# Patient Record
Sex: FEMALE | Race: WHITE | NOT HISPANIC OR LATINO
[De-identification: names, ages, dates, MRNs, and addresses within clinical notes are randomized per-mention and may not be internally consistent; named-entity substitution may affect disease eponyms.]

---

## 2019-12-01 ENCOUNTER — TRANSCRIPTION ENCOUNTER (OUTPATIENT)
Age: 69
End: 2019-12-01

## 2019-12-02 ENCOUNTER — OUTPATIENT (OUTPATIENT)
Dept: OUTPATIENT SERVICES | Facility: HOSPITAL | Age: 69
LOS: 1 days | End: 2019-12-02
Payer: MEDICARE

## 2019-12-02 VITALS
WEIGHT: 136.25 LBS | HEART RATE: 67 BPM | SYSTOLIC BLOOD PRESSURE: 136 MMHG | DIASTOLIC BLOOD PRESSURE: 73 MMHG | TEMPERATURE: 98 F | RESPIRATION RATE: 16 BRPM | OXYGEN SATURATION: 98 % | HEIGHT: 59 IN

## 2019-12-02 VITALS
OXYGEN SATURATION: 100 % | RESPIRATION RATE: 16 BRPM | HEART RATE: 79 BPM | DIASTOLIC BLOOD PRESSURE: 68 MMHG | SYSTOLIC BLOOD PRESSURE: 129 MMHG

## 2019-12-02 DIAGNOSIS — Z98.890 OTHER SPECIFIED POSTPROCEDURAL STATES: Chronic | ICD-10-CM

## 2019-12-02 DIAGNOSIS — H33.012 RETINAL DETACHMENT WITH SINGLE BREAK, LEFT EYE: ICD-10-CM

## 2019-12-02 DIAGNOSIS — Z98.49 CATARACT EXTRACTION STATUS, UNSPECIFIED EYE: Chronic | ICD-10-CM

## 2019-12-02 DIAGNOSIS — Z90.49 ACQUIRED ABSENCE OF OTHER SPECIFIED PARTS OF DIGESTIVE TRACT: Chronic | ICD-10-CM

## 2019-12-02 DIAGNOSIS — Z90.710 ACQUIRED ABSENCE OF BOTH CERVIX AND UTERUS: Chronic | ICD-10-CM

## 2019-12-02 PROCEDURE — 67108 REPAIR DETACHED RETINA: CPT | Mod: LT

## 2019-12-02 PROCEDURE — 93005 ELECTROCARDIOGRAM TRACING: CPT

## 2019-12-02 PROCEDURE — 99202 OFFICE O/P NEW SF 15 MIN: CPT

## 2019-12-02 PROCEDURE — C1889: CPT

## 2019-12-02 PROCEDURE — 93010 ELECTROCARDIOGRAM REPORT: CPT

## 2019-12-02 NOTE — ASU DISCHARGE PLAN (ADULT/PEDIATRIC) - CARE PROVIDER_API CALL
Ralph Lopez)  Ophthalmology  [address] [place] [address], [address]  [place] [address]  Phone: [phone]  Fax: [phone]  Follow Up Time:

## 2019-12-02 NOTE — ASU DISCHARGE PLAN (ADULT/PEDIATRIC) - ASU DC SPECIAL INSTRUCTIONSFT
tylenol  (acetaminophen) for pain  leave on eye patch  Position sitting up  Sleep on either side,   but do NOT sleep on back

## 2019-12-02 NOTE — ASU PATIENT PROFILE, ADULT - PSH
History of colonoscopy    Status post cataract extraction  both eyes  Status post cholecystectomy  lparascopically  Status post total abdominal hysterectomy and bilateral salpingo-oophorectomy

## 2022-03-27 PROBLEM — E03.9 HYPOTHYROIDISM, UNSPECIFIED: Chronic | Status: ACTIVE | Noted: 2019-12-02

## 2022-03-27 PROBLEM — J45.909 UNSPECIFIED ASTHMA, UNCOMPLICATED: Chronic | Status: ACTIVE | Noted: 2019-12-02

## 2022-03-27 PROBLEM — E78.5 HYPERLIPIDEMIA, UNSPECIFIED: Chronic | Status: ACTIVE | Noted: 2019-12-02

## 2022-03-27 PROBLEM — F32.9 MAJOR DEPRESSIVE DISORDER, SINGLE EPISODE, UNSPECIFIED: Chronic | Status: ACTIVE | Noted: 2019-12-02

## 2022-05-10 ENCOUNTER — APPOINTMENT (OUTPATIENT)
Dept: ORTHOPEDIC SURGERY | Facility: CLINIC | Age: 72
End: 2022-05-10
Payer: MEDICARE

## 2022-05-10 VITALS — HEIGHT: 61 IN | WEIGHT: 134 LBS | BODY MASS INDEX: 25.3 KG/M2

## 2022-05-10 DIAGNOSIS — Z78.9 OTHER SPECIFIED HEALTH STATUS: ICD-10-CM

## 2022-05-10 PROBLEM — Z00.00 ENCOUNTER FOR PREVENTIVE HEALTH EXAMINATION: Status: ACTIVE | Noted: 2022-05-10

## 2022-05-10 PROCEDURE — 99214 OFFICE O/P EST MOD 30 MIN: CPT

## 2022-05-10 RX ORDER — MELOXICAM 15 MG/1
15 TABLET ORAL
Qty: 30 | Refills: 1 | Status: ACTIVE | COMMUNITY
Start: 2022-05-10 | End: 1900-01-01

## 2022-05-10 NOTE — HISTORY OF PRESENT ILLNESS
[Gradual] : gradual [3] : 3 [Dull/Aching] : dull/aching [Intermittent] : intermittent [Leisure] : leisure [Injection therapy] : injection therapy [Stairs] : stairs [Retired] : Work status: retired [de-identified] : The patient has had increased pain in both knees over the past few weeks.  She has mild to moderate pain standing and walking.  Pain with stairs and movie sign.  She had some temporary improvement after the Zilretta injections in late January, early February.  Taking ibuprofen p.r.n.  Doing home exercises [] : Post Surgical Visit: no [FreeTextEntry1] : B Knees  [de-identified] : 2/1/2022 [de-identified] : Dr. Vasquez

## 2022-05-10 NOTE — DISCUSSION/SUMMARY
[de-identified] : Ice p.r.n.\par Discontinue ibuprofen.  She can try meloxicam 15 mg q.d. with food p.r.n.\par I discussed Gelsyn injections for both knees.  Risks benefits and the alternatives were discussed.  She would like to try this\par Recommend she schedule consult with Dr. Rj Yanez to discuss knee replacements\par She is going to be going on a [employer] cruise August 27.  I discussed possible repeat Zilretta injections prior to this\par \par Impression:\par Moderate to severe patellofemoral osteoarthritis right knee\par Moderate to severe patellofemoral osteoarthritis left knee

## 2022-05-10 NOTE — PHYSICAL EXAM
[Normal Mood and Affect] : normal mood and affect [Able to Communicate] : able to communicate [Well Developed] : well developed [Well Nourished] : well nourished [5___] : quadriceps 5[unfilled]/5 [FreeTextEntry3] : No swelling [de-identified] : Slight dystrophic gait [TWNoteComboBox7] : flexion 120 degrees [de-identified] : extension 0 degrees [Bilateral] : foot and ankle bilaterally [] : no calf tenderness [2+] : posterior tibialis pulse: 2+

## 2022-05-24 ENCOUNTER — APPOINTMENT (OUTPATIENT)
Dept: ORTHOPEDIC SURGERY | Facility: CLINIC | Age: 72
End: 2022-05-24
Payer: MEDICARE

## 2022-05-24 ENCOUNTER — RESULT REVIEW (OUTPATIENT)
Age: 72
End: 2022-05-24

## 2022-05-24 VITALS — HEIGHT: 61 IN | BODY MASS INDEX: 25.3 KG/M2 | WEIGHT: 134 LBS

## 2022-05-24 PROCEDURE — 99213 OFFICE O/P EST LOW 20 MIN: CPT | Mod: 25

## 2022-05-24 PROCEDURE — 20611 DRAIN/INJ JOINT/BURSA W/US: CPT | Mod: 50

## 2022-05-24 RX ORDER — METHYLPREDNISOLONE ACETATE 40 MG/ML
40 INJECTION, SUSPENSION INTRA-ARTICULAR; INTRALESIONAL; INTRAMUSCULAR; SOFT TISSUE
Refills: 0 | Status: COMPLETED | OUTPATIENT
Start: 2022-05-24

## 2022-05-24 RX ORDER — HYALURONATE SODIUM 16.8MG/2ML
16.8 SYRINGE (ML) INTRAARTICULAR
Refills: 0 | Status: COMPLETED | OUTPATIENT
Start: 2022-05-24

## 2022-05-24 RX ADMIN — Medication MG/2ML: at 00:00

## 2022-05-24 RX ADMIN — METHYLPREDNISOLONE ACETATE MG/ML: 40 INJECTION, SUSPENSION INTRA-ARTICULAR; INTRALESIONAL; INTRAMUSCULAR; SOFT TISSUE at 00:00

## 2022-05-24 NOTE — DATA REVIEWED
[Venous Doppler] : A Venous Doppler test was completed of the [Right] : right [Negative] : negative [FreeTextEntry1] : Venous Doppler right lower extremity done at Dr. Aviles 5/24/22 was reported as negative for DVT.  3.1 x 1.1 x 3.3 cm Baker's cyst

## 2022-05-24 NOTE — PROCEDURE
[Right] : of the right [___ cc    1%] : Lidocaine ~Vcc of 1%  [___ cc    40mg] : Methylprednisolone (Depomedrol) ~Vcc of 40 mg  [] : Patient tolerated procedure well [Risks, benefits, alternatives discussed / Verbal consent obtained] : the risks benefits, and alternatives have been discussed, and verbal consent was obtained [Large Joint Injection] : Large joint injection [Left] : of the left [Knee] : knee [Pain] : pain [Alcohol] : alcohol [Betadine] : betadine [Ethyl Chloride sprayed topically] : ethyl chloride sprayed topically [Sterile technique used] : sterile technique used [Other: ____] : [unfilled] [#1] : series #1 [Patient was advised to rest the joint(s) for ____ days] : patient was advised to rest the joint(s) for [unfilled] days [Altered anatomic landmarks d/t erosive arthritis] : altered anatomic landmarks d/t erosive arthritis [All ultrasound images have been permanently captured and stored accordingly in our picture archiving and communication system] : All ultrasound images have been permanently captured and stored accordingly in our picture archiving and communication system [de-identified] : Do not submerge underwater for 24 hours [FreeTextEntry3] : Do not submerge underwater for 24 hours

## 2022-05-24 NOTE — PHYSICAL EXAM
[Normal Mood and Affect] : normal mood and affect [Able to Communicate] : able to communicate [Well Developed] : well developed [Well Nourished] : well nourished [FreeTextEntry3] : No swelling [de-identified] : Right antalgic gait [TWNoteComboBox7] : flexion 120 degrees [de-identified] : extension 0 degrees [Bilateral] : foot and ankle bilaterally [] : no calf tenderness

## 2022-05-24 NOTE — HISTORY OF PRESENT ILLNESS
[Gradual] : gradual [3] : 3 [Dull/Aching] : dull/aching [Intermittent] : intermittent [Leisure] : leisure [Injection therapy] : injection therapy [Stairs] : stairs [Retired] : Work status: retired [1] : 1 [Gelsyn] : Gelsyn [de-identified] : The patient has had increased pain in her right knee and continued pain in her left knee over the past 2 weeks.\par She has moderate sharp pain right knee standing and walking.  Pain with stairs and movie sign.  Pain in her posterior knee.\par She has mild to moderate pain left knee standing and walking.  Pain with stairs and movie sign.  Taking meloxicam p.r.n. [] : Post Surgical Visit: no

## 2022-05-24 NOTE — DISCUSSION/SUMMARY
[de-identified] : Ice p.r.n.\par Meloxicam 15 mg q.d. with food p.r.n.\par I offered her a cortisone injection right knee today.  Risks benefits and the alternatives were discussed.  She would like to do this in her right knee today and start the Gelsyn next week\par I discussed Gelsyn injections for both knees.  Risks benefits and the alternatives were discussed.  She would like to try this\par Recommend she schedule consult with Dr. Rj Yanez to discuss knee replacements\par She is going to be going on a [employer] cruise August 27. \par She was sent for a venous Doppler today which was negative for DVT. Popliteal cyst was noted\par I discussed possible MRI right knee if she is not feeling better\par \par Impression:\par Moderate to severe patellofemoral osteoarthritis right knee\par Moderate to severe patellofemoral osteoarthritis left knee

## 2022-05-26 ENCOUNTER — APPOINTMENT (OUTPATIENT)
Dept: ORTHOPEDIC SURGERY | Facility: CLINIC | Age: 72
End: 2022-05-26
Payer: MEDICARE

## 2022-05-26 ENCOUNTER — NON-APPOINTMENT (OUTPATIENT)
Age: 72
End: 2022-05-26

## 2022-05-26 VITALS — WEIGHT: 134 LBS | BODY MASS INDEX: 25.3 KG/M2 | HEIGHT: 61 IN

## 2022-05-26 PROCEDURE — 99214 OFFICE O/P EST MOD 30 MIN: CPT

## 2022-05-26 NOTE — PHYSICAL EXAM
[Bilateral] : knee bilaterally [NL (140)] : flexion 140 degrees [NL (0)] : extension 0 degrees [5___] : hamstring 5[unfilled]/5 [] : no atrophy [FreeTextEntry3] : lateral subluxation patella b/l\par she has LLE weakness with dorsi flexion.  [de-identified] : AT 5-/5 EHL 4+/5 On the RIGHT LE\par LLE AT/GS/EHL/FHL 5/5

## 2022-05-26 NOTE — DISCUSSION/SUMMARY
[de-identified] : Options were discussed. She wants to go ahead with B/L TKA in the fall. Discussed eval for the Right LE gait disturbance. SHe is going away in August and would prefer to do the sx in October. She was instructed to watch the total joint seminar. SHe understands she will need to wait 4 months due to having CSI 2 days ago. \par \par The Risks, benefits, alternatives and expectations of the proposed procedure, as well as non-operative management, were discussed at length with the patient, as well as the procedure itself and the expected recovery period. The possible need for post operative Physical Therapy was also discussed. The patient was allowed as much tome as necessary to ask all appropriate questions and they were answered to the patient's satisfaction.\par \par

## 2022-05-31 ENCOUNTER — APPOINTMENT (OUTPATIENT)
Dept: ORTHOPEDIC SURGERY | Facility: CLINIC | Age: 72
End: 2022-05-31
Payer: MEDICARE

## 2022-05-31 VITALS — BODY MASS INDEX: 25.3 KG/M2 | HEIGHT: 61 IN | WEIGHT: 134 LBS

## 2022-05-31 PROCEDURE — 20611 DRAIN/INJ JOINT/BURSA W/US: CPT | Mod: 50

## 2022-06-01 RX ORDER — HYALURONATE SODIUM 16.8MG/2ML
16.8 SYRINGE (ML) INTRAARTICULAR
Refills: 0 | Status: COMPLETED | OUTPATIENT
Start: 2022-06-01

## 2022-06-01 RX ADMIN — Medication MG/2ML: at 00:00

## 2022-06-01 NOTE — DISCUSSION/SUMMARY
[de-identified] : Ice p.r.n.\par Meloxicam 15 mg q.d. with food p.r.n.\par The patient saw Dr. Rj Yanez and will most likely proceed with knee replacements in the fall\par She is going to be going on a [employer] cruise August 27. \par She schedule neurology consult with Dr. Kacie Ruffin for next week.  Recommend she proceed with this to rule out any possible neurologic cause for her altered gait noted last week which seems to have resolved\par \par Impression:\par Moderate to severe patellofemoral osteoarthritis right knee\par Moderate to severe patellofemoral osteoarthritis left knee

## 2022-06-01 NOTE — PROCEDURE
[Right] : of the right [___ cc    40mg] : Methylprednisolone (Depomedrol) ~Vcc of 40 mg  [#1] : series #1 [] : Patient tolerated procedure well [Risks, benefits, alternatives discussed / Verbal consent obtained] : the risks benefits, and alternatives have been discussed, and verbal consent was obtained [Large Joint Injection] : Large joint injection [Left] : of the left [Knee] : knee [Pain] : pain [Alcohol] : alcohol [Betadine] : betadine [Ethyl Chloride sprayed topically] : ethyl chloride sprayed topically [Sterile technique used] : sterile technique used [Other: ____] : [unfilled] [#2] : series #2 [Patient was advised to rest the joint(s) for ____ days] : patient was advised to rest the joint(s) for [unfilled] days [Altered anatomic landmarks d/t erosive arthritis] : altered anatomic landmarks d/t erosive arthritis [All ultrasound images have been permanently captured and stored accordingly in our picture archiving and communication system] : All ultrasound images have been permanently captured and stored accordingly in our picture archiving and communication system [de-identified] : Do not submerge underwater for 24 hours [FreeTextEntry3] : Do not submerge underwater for 24 hours

## 2022-06-01 NOTE — HISTORY OF PRESENT ILLNESS
[Gradual] : gradual [4] : 4 [Dull/Aching] : dull/aching [Intermittent] : intermittent [Retired] : Work status: retired [2] : 2 [Gelsyn] : Gelsyn [de-identified] : The patient says that her right knee and walking are significantly improved.  She felt a pop in her right knee after her visit with Dr. Yanez and was able to walk without difficulty.  She has mild pain in both knees standing and walking.  Mild to moderate pain after sitting and getting up.  She did have improvement after cortisone injection right knee and Gelsyn injection left knee after last visit [] : Post Surgical Visit: no [de-identified] : 5/24/2022

## 2022-06-01 NOTE — PHYSICAL EXAM
[Normal Mood and Affect] : normal mood and affect [Able to Communicate] : able to communicate [Well Developed] : well developed [Well Nourished] : well nourished [FreeTextEntry3] : No swelling [de-identified] : Right antalgic gait [TWNoteComboBox7] : flexion 120 degrees [de-identified] : extension 0 degrees [Bilateral] : foot and ankle bilaterally [] : no calf tenderness

## 2022-06-07 ENCOUNTER — APPOINTMENT (OUTPATIENT)
Dept: ORTHOPEDIC SURGERY | Facility: CLINIC | Age: 72
End: 2022-06-07
Payer: MEDICARE

## 2022-06-07 VITALS — BODY MASS INDEX: 25.3 KG/M2 | WEIGHT: 134 LBS | HEIGHT: 61 IN

## 2022-06-07 PROCEDURE — 20611 DRAIN/INJ JOINT/BURSA W/US: CPT | Mod: 50

## 2022-06-07 RX ORDER — HYALURONATE SODIUM 16.8MG/2ML
16.8 SYRINGE (ML) INTRAARTICULAR
Refills: 0 | Status: COMPLETED | OUTPATIENT
Start: 2022-06-07

## 2022-06-07 RX ADMIN — Medication MG/2ML: at 00:00

## 2022-06-07 NOTE — HISTORY OF PRESENT ILLNESS
[Gradual] : gradual [4] : 4 [Dull/Aching] : dull/aching [Intermittent] : intermittent [Leisure] : leisure [Injection therapy] : injection therapy [Walking] : walking [Stairs] : stairs [Retired] : Work status: retired [3] : 3 [Gelsyn] : Gelsyn [de-identified] : The patient feels better after the first Gelsyn right knee, second Gelsyn left knee.  She has mild pain standing and walking. [] : Post Surgical Visit: no

## 2022-06-07 NOTE — PROCEDURE
[Right] : of the right [#2] : series #2 [] : Patient tolerated procedure well [Risks, benefits, alternatives discussed / Verbal consent obtained] : the risks benefits, and alternatives have been discussed, and verbal consent was obtained [Large Joint Injection] : Large joint injection [Left] : of the left [Knee] : knee [Pain] : pain [Alcohol] : alcohol [Betadine] : betadine [Ethyl Chloride sprayed topically] : ethyl chloride sprayed topically [Sterile technique used] : sterile technique used [Other: ____] : [unfilled] [#3] : series #3 [Patient was advised to rest the joint(s) for ____ days] : patient was advised to rest the joint(s) for [unfilled] days [Altered anatomic landmarks d/t erosive arthritis] : altered anatomic landmarks d/t erosive arthritis [All ultrasound images have been permanently captured and stored accordingly in our picture archiving and communication system] : All ultrasound images have been permanently captured and stored accordingly in our picture archiving and communication system [de-identified] : Do not submerge underwater for 24 hours [FreeTextEntry3] : Do not submerge underwater for 24 hours

## 2022-06-07 NOTE — DISCUSSION/SUMMARY
[de-identified] : Ice p.r.n.\par Meloxicam 15 mg q.d. with food p.r.n.\par The patient saw Dr. Rj Yanez and will most likely proceed with knee replacements in October\par She is going to be going [place] a [place] cruise August 26. \par She is not going to have neurology consult since she is walking normally at the present time\par \par Impression:\par Moderate to severe patellofemoral osteoarthritis right knee\par Moderate to severe patellofemoral osteoarthritis left knee

## 2022-06-14 ENCOUNTER — APPOINTMENT (OUTPATIENT)
Dept: ORTHOPEDIC SURGERY | Facility: CLINIC | Age: 72
End: 2022-06-14
Payer: MEDICARE

## 2022-06-14 VITALS — HEIGHT: 61 IN | BODY MASS INDEX: 25.3 KG/M2 | WEIGHT: 134 LBS

## 2022-06-14 PROCEDURE — 20611 DRAIN/INJ JOINT/BURSA W/US: CPT

## 2022-06-14 RX ORDER — HYALURONATE SODIUM 16.8MG/2ML
16.8 SYRINGE (ML) INTRAARTICULAR
Refills: 0 | Status: COMPLETED | OUTPATIENT
Start: 2022-06-14

## 2022-06-14 RX ADMIN — Medication MG/2ML: at 00:00

## 2022-06-14 NOTE — PROCEDURE
[Large Joint Injection] : Large joint injection [Right] : of the right [Knee] : knee [Pain] : pain [Alcohol] : alcohol [Betadine] : betadine [Ethyl Chloride sprayed topically] : ethyl chloride sprayed topically [Sterile technique used] : sterile technique used [Other: ____] : [unfilled] [#3] : series #3 [] : Patient tolerated procedure well [Patient was advised to rest the joint(s) for ____ days] : patient was advised to rest the joint(s) for [unfilled] days [Risks, benefits, alternatives discussed / Verbal consent obtained] : the risks benefits, and alternatives have been discussed, and verbal consent was obtained [Altered anatomic landmarks d/t erosive arthritis] : altered anatomic landmarks d/t erosive arthritis [All ultrasound images have been permanently captured and stored accordingly in our picture archiving and communication system] : All ultrasound images have been permanently captured and stored accordingly in our picture archiving and communication system [FreeTextEntry3] : Do not submerge underwater for 24 hours

## 2022-06-14 NOTE — PHYSICAL EXAM
[Normal Mood and Affect] : normal mood and affect [Able to Communicate] : able to communicate [Well Developed] : well developed [Well Nourished] : well nourished [FreeTextEntry3] : No swelling [de-identified] : Normal gait [TWNoteComboBox7] : flexion 120 degrees [de-identified] : extension 0 degrees [Right] : right foot and ankle [] : no calf tenderness

## 2022-06-14 NOTE — HISTORY OF PRESENT ILLNESS
[Dull/Aching] : dull/aching [Intermittent] : intermittent [Leisure] : leisure [Injection therapy] : injection therapy [Stairs] : stairs [Retired] : Work status: retired [3] : 3 [Gelsyn] : Gelsyn [de-identified] : The patient feels better after the third Gelsyn left knee, second Gelsyn right knee.  She has mild to moderate pain standing and walking.  Pain after sitting and getting up [] : Post Surgical Visit: no [de-identified] : 6/7/2022

## 2022-06-14 NOTE — DISCUSSION/SUMMARY
[de-identified] : Ice p.r.n.\par Meloxicam 15 mg q.d. with food p.r.n.\par The patient saw Dr. Rj Yanez and will most likely proceed with knee replacements in October\par She is going to be going on a [place] cruise August 26. \par \par \par Impression:\par Moderate to severe patellofemoral osteoarthritis right knee\par Moderate to severe patellofemoral osteoarthritis left knee

## 2022-07-12 ENCOUNTER — RX RENEWAL (OUTPATIENT)
Age: 72
End: 2022-07-12

## 2022-09-08 ENCOUNTER — RX RENEWAL (OUTPATIENT)
Age: 72
End: 2022-09-08

## 2022-09-08 RX ORDER — CELECOXIB 200 MG/1
200 CAPSULE ORAL
Qty: 60 | Refills: 1 | Status: ACTIVE | COMMUNITY
Start: 2022-07-09 | End: 1900-01-01

## 2022-09-28 ENCOUNTER — RESULT REVIEW (OUTPATIENT)
Age: 72
End: 2022-09-28

## 2022-09-28 ENCOUNTER — FORM ENCOUNTER (OUTPATIENT)
Age: 72
End: 2022-09-28

## 2022-10-02 ENCOUNTER — FORM ENCOUNTER (OUTPATIENT)
Age: 72
End: 2022-10-02

## 2022-10-10 ENCOUNTER — FORM ENCOUNTER (OUTPATIENT)
Age: 72
End: 2022-10-10

## 2022-10-11 ENCOUNTER — APPOINTMENT (OUTPATIENT)
Dept: ORTHOPEDIC SURGERY | Facility: HOSPITAL | Age: 72
End: 2022-10-11

## 2022-10-11 PROCEDURE — 20985 CPTR-ASST DIR MS PX: CPT

## 2022-10-11 PROCEDURE — 27447 TOTAL KNEE ARTHROPLASTY: CPT | Mod: RT

## 2022-10-11 PROCEDURE — 27447 TOTAL KNEE ARTHROPLASTY: CPT | Mod: AS,RT

## 2022-10-13 ENCOUNTER — FORM ENCOUNTER (OUTPATIENT)
Age: 72
End: 2022-10-13

## 2022-10-14 ENCOUNTER — APPOINTMENT (OUTPATIENT)
Dept: ORTHOPEDIC SURGERY | Facility: HOSPITAL | Age: 72
End: 2022-10-14

## 2022-10-14 PROCEDURE — 20985 CPTR-ASST DIR MS PX: CPT

## 2022-10-14 PROCEDURE — 27447 TOTAL KNEE ARTHROPLASTY: CPT | Mod: 79,LT

## 2022-10-14 PROCEDURE — 27447 TOTAL KNEE ARTHROPLASTY: CPT | Mod: AS,LT,79

## 2022-10-23 RX ORDER — OXYCODONE 10 MG/1
10 TABLET ORAL EVERY 4 HOURS
Qty: 30 | Refills: 0 | Status: ACTIVE | COMMUNITY
Start: 2022-10-23 | End: 1900-01-01

## 2022-10-26 ENCOUNTER — APPOINTMENT (OUTPATIENT)
Dept: ORTHOPEDIC SURGERY | Facility: CLINIC | Age: 72
End: 2022-10-26

## 2022-10-26 PROCEDURE — 99024 POSTOP FOLLOW-UP VISIT: CPT

## 2022-10-26 PROCEDURE — 73560 X-RAY EXAM OF KNEE 1 OR 2: CPT | Mod: 50

## 2022-10-26 NOTE — HISTORY OF PRESENT ILLNESS
[de-identified] : following up for bilateral knees. Patient is s/p staged TKAs 10/11 and 10/14/2022.  Patient states she is doing well. She denies any fever chills or drainage from the knee.  She has been using the TEDS and taking her ASA BID.  She is currently ambulating with walker.  She is working with PT at home.

## 2022-10-26 NOTE — DISCUSSION/SUMMARY
[de-identified] : She will WBAT b/l LE . continue PT no restrictions. continued TEDS for 2 more weeks, The ASA for 2 more days.  She will f/u in 1 mos. \par patient advised to use walker until she is safely able to ambulate without it.\par patient advised to continue stocking use for another week,\par patient advised to put a pillow beneath feet to help knee\par \par Entered by Cinthia Crow acting as scribe.\par Dr. Yanez Attestation\par The documentation recorded by the scribe, in my presence, accurately reflects the service I, Dr. Yanez, personally performed, and the decisions made by me with my edits as appropriate.\par

## 2022-10-26 NOTE — PHYSICAL EXAM
[Bilateral] : knee bilaterally [Components well fixed, in good position] : Components well fixed, in good position [de-identified] : Constitutional: The patient appears well developed, well nourished.  \par \par  \par \par Neurologic: Coordination is normal. Alert and oriented to time, place and person. No evidence of mood disorder, calm affect.  \par \par  \par \par Right      KNEE: Inspection of the knee is as follows: moderate effusion and small ecchymosis. no streaking, no erythema, no atrophy, no deformities of the quad tendon and no deformities of patellar tendon.  \par \par  \par \par Inspection of the wound reveals clean and dry incision, no fluctuance, no sign of infection, no erythema and no drainage. Mesh/Sutures were removed. \par \par  \par \par Palpation of the knee is as follows: no increased warmth. \par \par  \par \par Knee Range of Motion is as follows in degrees:   \par \par  \par \par Extension: 0 \par \par Flexion: 115  \par \par  \par \par Strength examination of the knee is as follows:  \par \par Quadriceps strength is 5/5  \par \par Hamstring strength is 5/5  \par \par  \par \par Ligament Stability and Special Test ligamentously stable and no varus or valgus instability. patella tracks well and able to do active straight leg raise without an extensor lag.  \par \par  \par \par Neurological examination of the knee is as follows: light touch is intact throughout.  \par \par  \par \par Gait and function is as follows: mildly antalgic gait.  WALKER\par \par  \par \par Constitutional: The patient appears well developed, well nourished.  \par \par  \par \par Neurologic: Coordination is normal. Alert and oriented to time, place and person. No evidence of mood disorder, calm affect.  \par \par  \par \par   LEFT    KNEE: Inspection of the knee is as follows: moderate effusion and small ecchymosis. no streaking, no erythema, no atrophy, no deformities of the quad tendon and no deformities of patellar tendon.  \par \par  \par \par Inspection of the wound reveals clean and dry incision, no fluctuance, no sign of infection, no erythema and no drainage. Mesh/Sutures were removed. \par \par  \par \par Palpation of the knee is as follows: no increased warmth. \par \par  \par \par Knee Range of Motion is as follows in degrees:   \par \par  \par \par Extension: 3\par \par Flexion: 115  \par \par  \par \par Strength examination of the knee is as follows:  \par \par Quadriceps strength is 5/5  \par \par Hamstring strength is 5/5  \par \par  \par \par Ligament Stability and Special Test ligamentously stable and no varus or valgus instability. patella tracks well and able to do active straight leg raise without an extensor lag.  \par \par  \par \par Neurological examination of the knee is as follows: light touch is intact throughout.  \par \par  \par \par Gait and function is as follows: mildly antalgic gait.  \par WALKER\par

## 2022-10-29 RX ORDER — OXYCODONE 5 MG/1
5 TABLET ORAL
Qty: 40 | Refills: 0 | Status: ACTIVE | COMMUNITY
Start: 2022-10-24 | End: 1900-01-01

## 2022-11-13 ENCOUNTER — FORM ENCOUNTER (OUTPATIENT)
Age: 72
End: 2022-11-13

## 2022-12-03 RX ORDER — OXYCODONE 5 MG/1
5 TABLET ORAL EVERY 6 HOURS
Qty: 10 | Refills: 0 | Status: ACTIVE | COMMUNITY
Start: 2022-12-03 | End: 1900-01-01

## 2022-12-08 ENCOUNTER — FORM ENCOUNTER (OUTPATIENT)
Age: 72
End: 2022-12-08

## 2022-12-08 ENCOUNTER — APPOINTMENT (OUTPATIENT)
Dept: ORTHOPEDIC SURGERY | Facility: CLINIC | Age: 72
End: 2022-12-08

## 2022-12-08 DIAGNOSIS — M17.11 UNILATERAL PRIMARY OSTEOARTHRITIS, RIGHT KNEE: ICD-10-CM

## 2022-12-08 DIAGNOSIS — Z87.39 PERSONAL HISTORY OF OTHER DISEASES OF THE MUSCULOSKELETAL SYSTEM AND CONNECTIVE TISSUE: ICD-10-CM

## 2022-12-08 PROCEDURE — 99024 POSTOP FOLLOW-UP VISIT: CPT

## 2022-12-08 RX ORDER — IBUPROFEN 800 MG/1
800 TABLET, FILM COATED ORAL 3 TIMES DAILY
Qty: 40 | Refills: 0 | Status: ACTIVE | COMMUNITY
Start: 2022-12-08 | End: 1900-01-01

## 2022-12-08 NOTE — DISCUSSION/SUMMARY
[de-identified] : SHe will continue PT no restrictions. She needs to get MRI L/S spine to eval for HNP.  She had MRI many yrs ago.  Discussed AFO race.  She will get MRI and f/u with spine specialty.

## 2022-12-08 NOTE — HISTORY OF PRESENT ILLNESS
[de-identified] : following up for bilateral knees. Patient is s/p staged TKAs 10/11 and 10/14/2022.   Patient state sthe knees are doing very well.  She states she is progressing well with PT.  She has very good motion. Denies any paresthesias. She has noted her RLE foot has been weak and she had this issue prior to her TKA's

## 2022-12-08 NOTE — PHYSICAL EXAM
[Bilateral] : knee bilaterally [Components well fixed, in good position] : Components well fixed, in good position [de-identified] : Constitutional: The patient appears well developed, well nourished.  \par \par  \par \par Neurologic: Coordination is normal. Alert and oriented to time, place and person. No evidence of mood disorder, calm affect.  \par \par  \par \par Right      KNEE: Inspection of the knee is as follows:  MILD TO moderate effusion and small ecchymosis. no streaking, no erythema, no atrophy, no deformities of the quad tendon and no deformities of patellar tendon.  \par \par  \par \par Inspection of the wound reveals clean and dry incision, no fluctuance, no sign of infection, no erythema and no drainage. WOUNDS ARE WELL HEALED> NO SIGN SOF INFECTION NOTED\par \par  \par \par Palpation of the knee is as follows: no increased warmth. \par \par  \par \par Knee Range of Motion is as follows in degrees:   \par \par  \par \par Extension: 0 \par \par Flexion: 125  \par \par  \par \par Strength examination of the knee is as follows:  \par \par Quadriceps strength is 5/5  \par \par Hamstring strength is 5/5  \par \par  \par \par Ligament Stability and Special Test ligamentously stable and no varus or valgus instability. patella tracks well and able to do active straight leg raise without an extensor lag.  \par \par  \par \par Neurological examination of the knee is as follows: light touch is intact throughout.  \par \par  \par \par Gait and function is as follows: mildly antalgic gait.  CANE\par AT 5-/5\par EHL 4+/5\par When she ambulates she exhibits drop foot. \par \par  \par \par Constitutional: The patient appears well developed, well nourished.  \par \par  \par \par Neurologic: Coordination is normal. Alert and oriented to time, place and person. No evidence of mood disorder, calm affect.  \par \par  \par \par   LEFT    KNEE: Inspection of the knee is as follows: moderate effusion and small ecchymosis. no streaking, no erythema, no atrophy, no deformities of the quad tendon and no deformities of patellar tendon.  \par \par  \par \par Inspection of the wound reveals clean and dry incision, no fluctuance, no sign of infection, no erythema and no drainage. WOUNDS ARE WELL HEALED WITHOUT SIGNS OF INFECTION. \par \par  \par \par Palpation of the knee is as follows: no increased warmth. \par \par  \par \par Knee Range of Motion is as follows in degrees:   \par \par  \par \par Extension: 0\par \par Flexion: 125  \par \par  \par \par Strength examination of the knee is as follows:  \par \par Quadriceps strength is 5/5  \par \par Hamstring strength is 5/5  \par \par  \par \par Ligament Stability and Special Test ligamentously stable and no varus or valgus instability. patella tracks well and able to do active straight leg raise without an extensor lag.  \par \par  \par \par Neurological examination of the knee is as follows: light touch is intact throughout.  \par \par  \par \par Gait and function is as follows: mildly antalgic gait.  \par CANE\par

## 2022-12-09 ENCOUNTER — RESULT REVIEW (OUTPATIENT)
Age: 72
End: 2022-12-09

## 2022-12-20 RX ORDER — OXYCODONE 5 MG/1
5 TABLET ORAL
Qty: 40 | Refills: 0 | Status: ACTIVE | COMMUNITY
Start: 2022-11-05 | End: 1900-01-01

## 2023-01-12 ENCOUNTER — APPOINTMENT (OUTPATIENT)
Dept: ORTHOPEDIC SURGERY | Facility: CLINIC | Age: 73
End: 2023-01-12
Payer: MEDICARE

## 2023-01-12 PROCEDURE — 99024 POSTOP FOLLOW-UP VISIT: CPT

## 2023-01-12 NOTE — DISCUSSION/SUMMARY
[de-identified] : SHe will finish out PT and f/u with Chiro.  She will f/u with Ortho in 9 mos for repeat eval and xrays. \par \par The following information has been documented by Rj ELI acting as scribe.\par Dr. Yanez Attestation\par The documentation recorded by the scribe, in my presence, accurately reflects the service I, Dr. Yanez, personally performed, and the decisions made by me with my edits as appropriate.\par \par

## 2023-01-12 NOTE — HISTORY OF PRESENT ILLNESS
[de-identified] : following up for bilateral knees. Patient is s/p staged TKAs 10/11 and 10/14/2022. Patient is also here today for MRI results of the lumbar spine.  Patient was sent for MRI to aditi Right foot drop.  She has been to chiro who gave her some trx and she states the foot drop has resolved.  She still notes swelling to the knees and "clicking" She states over the past week or so she has noted a lot less pain.

## 2023-01-12 NOTE — REASON FOR VISIT
[FreeTextEntry2] : MRI impressions lumbar spine: Impingement of the descending right intrathecal L2 nerve root at L1-L2.\par \par Multilevel mild central canal and neural foraminal stenosis on the basis of\par multilevel spondylosis, scoliosis, listhesis, facet osteoarthrosis, and disc\par bulging as described and positioned above the findings of which appear to be\par chronic in nature.\par \par Chronic appearing compression deformities involving the superior endplate of L2\par and L3. No evidence for acute vertebral body fractures.

## 2023-01-12 NOTE — PHYSICAL EXAM
[Bilateral] : knee bilaterally [Components well fixed, in good position] : Components well fixed, in good position [de-identified] : Constitutional: The patient appears well developed, well nourished.  \par \par  \par \par Neurologic: Coordination is normal. Alert and oriented to time, place and person. No evidence of mood disorder, calm affect.  \par \par  \par \par Right      KNEE: Inspection of the knee is as follows:  MILD TO moderate effusion and NO ecchymosis. no streaking, no erythema, no atrophy, no deformities of the quad tendon and no deformities of patellar tendon.  \par Mid/distal incision hyperpigmentation noted\par  \par \par Inspection of the wound reveals clean and dry incision, no fluctuance, no sign of infection, no erythema and no drainage. WOUNDS ARE WELL HEALED> NO SIGN SOF INFECTION NOTED\par \par  \par \par Palpation of the knee is as follows: no increased warmth. \par \par  \par \par Knee Range of Motion is as follows in degrees:   \par \par  \par \par Extension: 0 \par \par Flexion: 125  \par \par  \par \par Strength examination of the knee is as follows:  \par \par Quadriceps strength is 5/5  \par \par Hamstring strength is 5/5  \par \par  \par \par Ligament Stability and Special Test ligamentously stable and no varus or valgus instability. patella tracks well and able to do active straight leg raise without an extensor lag.  \par \par  \par \par Neurological examination of the knee is as follows: light touch is intact throughout.  \par \par  \par \par Gait and function is as follows:  NON ANTALGIC GAIT\par \par  \par \par Constitutional: The patient appears well developed, well nourished.  \par \par  \par \par Neurologic: Coordination is normal. Alert and oriented to time, place and person. No evidence of mood disorder, calm affect.  \par \par  \par \par   LEFT    KNEE: Inspection of the knee is as follows: moderate effusion and NO ecchymosis. no streaking, no erythema, no atrophy, no deformities of the quad tendon and no deformities of patellar tendon.  \par \par  \par \par Inspection of the wound reveals clean and dry incision, no fluctuance, no sign of infection, no erythema and no drainage. WOUNDS ARE WELL HEALED WITHOUT SIGNS OF INFECTION. \par \par  \par \par Palpation of the knee is as follows: no increased warmth. \par \par  \par \par Knee Range of Motion is as follows in degrees:   \par \par  \par \par Extension: 0\par \par Flexion: 125  \par \par  \par \par Strength examination of the knee is as follows:  \par \par Quadriceps strength is 5/5  \par \par Hamstring strength is 5/5  \par \par  \par \par Ligament Stability and Special Test ligamentously stable and no varus or valgus instability. patella tracks well and able to do active straight leg raise without an extensor lag.  \par \par  \par \par Neurological examination of the knee is as follows: light touch is intact throughout.  \par \par  \par \par NON ANTALGIC GAIT\par

## 2023-05-08 RX ORDER — CLINDAMYCIN HYDROCHLORIDE 300 MG/1
300 CAPSULE ORAL
Qty: 6 | Refills: 1 | Status: ACTIVE | COMMUNITY
Start: 2023-05-08 | End: 1900-01-01

## 2023-11-09 ENCOUNTER — APPOINTMENT (OUTPATIENT)
Dept: ORTHOPEDIC SURGERY | Facility: CLINIC | Age: 73
End: 2023-11-09
Payer: MEDICARE

## 2023-11-09 DIAGNOSIS — Z78.9 OTHER SPECIFIED HEALTH STATUS: ICD-10-CM

## 2023-11-09 DIAGNOSIS — M17.0 BILATERAL PRIMARY OSTEOARTHRITIS OF KNEE: ICD-10-CM

## 2023-11-09 PROCEDURE — 73560 X-RAY EXAM OF KNEE 1 OR 2: CPT | Mod: 50

## 2023-11-09 PROCEDURE — 99213 OFFICE O/P EST LOW 20 MIN: CPT

## 2024-01-13 NOTE — ASU DISCHARGE PLAN (ADULT/PEDIATRIC) - PATIENT EDUCATION MATERIALS PROVIED
covid positive pCR Other (specify)/Eye shield with instructions , sunglasses and eye kit given to patient. Gas bubble instructions reviewed with good understanding ,gas bubble bracelet on pt.

## 2024-01-31 ENCOUNTER — APPOINTMENT (OUTPATIENT)
Dept: ORTHOPEDIC SURGERY | Facility: CLINIC | Age: 74
End: 2024-01-31

## 2024-02-07 ENCOUNTER — APPOINTMENT (OUTPATIENT)
Dept: ORTHOPEDIC SURGERY | Facility: CLINIC | Age: 74
End: 2024-02-07

## 2024-08-21 ENCOUNTER — NON-APPOINTMENT (OUTPATIENT)
Age: 74
End: 2024-08-21

## 2025-04-24 NOTE — ASU PATIENT PROFILE, ADULT - TEACHING/LEARNING DEVELOPMENTAL CONSIDERATIONS
Restorative Technician Note      Patient Name: Christopher Razo     Note Type: Mobility  Patient Position Upon Consult: Bedside chair  Activity Performed: Ambulated; Dangled; Stood  Assistive Device: Roller walker; Other (Comment) (Assist x1)  Education Provided: Yes  Patient Position at End of Consult: Bedside chair; All needs within reach; Bed/Chair alarm activated    Benny SALES, Restorative Technician,              none